# Patient Record
Sex: MALE | Race: WHITE | NOT HISPANIC OR LATINO | URBAN - METROPOLITAN AREA
[De-identification: names, ages, dates, MRNs, and addresses within clinical notes are randomized per-mention and may not be internally consistent; named-entity substitution may affect disease eponyms.]

---

## 2018-07-24 ENCOUNTER — EMERGENCY (EMERGENCY)
Facility: HOSPITAL | Age: 18
LOS: 1 days | Discharge: HOME | End: 2018-07-24
Attending: EMERGENCY MEDICINE | Admitting: EMERGENCY MEDICINE
Payer: COMMERCIAL

## 2018-07-24 VITALS
SYSTOLIC BLOOD PRESSURE: 130 MMHG | OXYGEN SATURATION: 100 % | HEART RATE: 64 BPM | RESPIRATION RATE: 16 BRPM | TEMPERATURE: 98 F | DIASTOLIC BLOOD PRESSURE: 74 MMHG

## 2018-07-24 PROCEDURE — 99284 EMERGENCY DEPT VISIT MOD MDM: CPT | Mod: 25

## 2018-07-24 PROCEDURE — 93010 ELECTROCARDIOGRAM REPORT: CPT

## 2018-07-24 NOTE — ED ADULT TRIAGE NOTE - CHIEF COMPLAINT QUOTE
Pt arrives w/ c/o chest pain since this morning. Pt worried because has history of 2 pneumothorax in the past. Denies SOB, nausea, or vomiting.

## 2018-07-25 VITALS
DIASTOLIC BLOOD PRESSURE: 77 MMHG | SYSTOLIC BLOOD PRESSURE: 122 MMHG | HEART RATE: 69 BPM | RESPIRATION RATE: 15 BRPM | OXYGEN SATURATION: 100 % | TEMPERATURE: 98 F

## 2018-07-25 PROCEDURE — 71046 X-RAY EXAM CHEST 2 VIEWS: CPT | Mod: 26

## 2018-07-25 NOTE — ED PROVIDER NOTE - PROGRESS NOTE DETAILS
Zvi Dubin, MD (pgy-[id number]) note: Pt seen & reassessed.  Pt pain free, not SOB, SpO2 100%.   A&Ox3, NAD, VSS, pt tolerated PO & ambulated w/steady unassisted gait in the ED.  Discussed results of ED w/u w/patient & his parents @bedside, and gave them a copy of results. Patient verbalized understanding of hospital course & f/u plans, has decisional making capacity.  Pt st they will f/u w/PMD within the next 3 days; pt agrees to call today or tomorrow for an appointment. Pt agrees to return to the ED if there is any worsening or concerning symptoms.

## 2018-07-25 NOTE — ED ADULT NURSE NOTE - OBJECTIVE STATEMENT
alert no distress  no c/o pain at present  has sternal chest pain earlier that started at 2230  no pain when lying down  pain is 4/10 when up    SR on scope  seen by Dr Dubin

## 2018-07-25 NOTE — ED PROVIDER NOTE - PLAN OF CARE
-- There is no pneuothorax on the Xray in the Emergency Department today.   -- Please follow up with your doctor(s) within the next 3 days, but seek medical care sooner if your symptoms worsen. Call for an appointment as soon as possible.  -- If you have worsening or concerning symptoms, see your doctor right away or return to the Emergency Department immediately.

## 2018-07-25 NOTE — ED PROVIDER NOTE - PHYSICAL EXAMINATION
mild kyphosis  no paradoxical chest movement, nasal flaring, retractions  no subcu air in neck or chest  No abd bruits or thrills.  No pulsatile abd masses.  No pulse deficits x4 ext. mild kyphosis  no paradoxical chest movement, nasal flaring, retractions  no subcu air in neck or chest  No abd bruits or thrills.  No pulsatile abd masses.  No pulse deficits x4 ext.  no LE edema, normal equal distal pulses.

## 2018-07-25 NOTE — ED PROVIDER NOTE - CARE PLAN
Principal Discharge DX:	Chest pain Principal Discharge DX:	Chest pain  Assessment and plan of treatment:	-- There is no pneuothorax on the Xray in the Emergency Department today.   -- Please follow up with your doctor(s) within the next 3 days, but seek medical care sooner if your symptoms worsen. Call for an appointment as soon as possible.  -- If you have worsening or concerning symptoms, see your doctor right away or return to the Emergency Department immediately.

## 2018-07-25 NOTE — ED PROVIDER NOTE - MEDICAL DECISION MAKING DETAILS
CP, possibly from PTX given hx.  pt HD stable, comfortable, without distress or hypoxia  plan for CXR & reassessment   no concern for ACS based on hx, age, RF & ekg CP, possibly from PTX given hx.  pt HD stable, comfortable, without distress or hypoxia.  minimal lung findings on exam may represent scar tissue from prior chest tube.  plan for CXR & reassessment   no concern for ACS based on hx, age, RF & ekg

## 2018-07-25 NOTE — ED PROVIDER NOTE - ATTENDING CONTRIBUTION TO CARE
18M PMH PTX (failed O2, s/p chest tube 2015) p/w intermittent midsternal cp, positional, x1d, no other systemic symptoms. Currently asymptomatic while sitting in stretcher. Vitals wnl, exam as above.  ddx: Likely MSK vs. GERD/gastritis vs. less likely PTX. clinically not ACS, PE, tamponade, dissection, perf, myocarditis, mediastinitis.   Does not want pain meds.  CXR, reassess.

## 2018-07-25 NOTE — ED PROVIDER NOTE - OBJECTIVE STATEMENT
18y M hx L PTX refractory to O2 req chest tube 2015 now p/w unprovoked 1d L inf/lat CP.  Worse w/ standing, better w/ sitting,  No noticeable change w/ exertion or deep breathing.  No SOB, BROWN, cough, hemoptysis.  Pt denied fvr/chills, HA, visual changes, dizziness, light headedness, presyncope, LOC, abd pain, n/v/d, changes in bowel or bladder habits, LE edema, numbness, weakness, changes in gait or rashes Denies sexual activity, abuse, drug or etoh use. 18y M hx L PTX refractory to O2 req chest tube 2015 now p/w unprovoked 1d L inf/lat CP.  Worse w/ standing, better w/ sitting,  No noticeable change w/ exertion or deep breathing.  No SOB, BROWN, cough, hemoptysis.  Pt denied fvr/chills, HA, visual changes, dizziness, light headedness, presyncope, LOC, abd pain, n/v/d, changes in bowel or bladder habits, LE edema, numbness, weakness, changes in gait or rashes Denies sexual activity, abuse, drug or etoh use.  [place]: 18M PMH PTX (failed O2, s/p chest tube 2015) p/w midsternal cp, awoke with it this morning, intermittent, mostly w/ standing and certain movements, resolved while sitting. Unsure if feels similar to prior PTX. Has not taken anything for pain. Denies SOB, NVD, lightheaded, diaphoresis, palpitations, cough/rhinorrhea, black/bloody stool, LE pain/swelling, focal weakness/numbness, recent travel/immobilization, abd pain, urinary complaints, f/c. No FMH CAD/clots. No prior cardiac w/u.

## 2022-02-03 ENCOUNTER — APPOINTMENT (OUTPATIENT)
Dept: OTOLARYNGOLOGY | Facility: CLINIC | Age: 22
End: 2022-02-03
Payer: COMMERCIAL

## 2022-02-03 VITALS
SYSTOLIC BLOOD PRESSURE: 110 MMHG | TEMPERATURE: 97.3 F | BODY MASS INDEX: 16.64 KG/M2 | HEART RATE: 88 BPM | WEIGHT: 106 LBS | DIASTOLIC BLOOD PRESSURE: 70 MMHG | OXYGEN SATURATION: 98 % | HEIGHT: 67 IN

## 2022-02-03 DIAGNOSIS — R05.9 COUGH, UNSPECIFIED: ICD-10-CM

## 2022-02-03 PROCEDURE — 99203 OFFICE O/P NEW LOW 30 MIN: CPT

## 2022-02-03 NOTE — HISTORY OF PRESENT ILLNESS
[de-identified] : Mr. HUSSEIN is a 21 year male with cough \par seen in urgent care and [place] \par was told it might be post nasal drip, covid was negative \par \par initially had URI, then developed sore throat and cough x 1 week, also had dysphonia \par minimal mucous with cough \par cough has since resolved down to only rare occurrence \par \par no fevers or chills, no myalgias\par

## 2022-02-03 NOTE — ASSESSMENT
[FreeTextEntry1] : cough and PND\par - sounds like he had paraflu, but testing at [facility] was negative\par - symptoms all resolve so no further intervention is recommended\par - work note written

## 2022-05-17 ENCOUNTER — NON-APPOINTMENT (OUTPATIENT)
Age: 22
End: 2022-05-17

## 2022-05-22 ENCOUNTER — NON-APPOINTMENT (OUTPATIENT)
Age: 22
End: 2022-05-22

## 2022-06-08 ENCOUNTER — APPOINTMENT (OUTPATIENT)
Dept: OTOLARYNGOLOGY | Facility: CLINIC | Age: 22
End: 2022-06-08
Payer: COMMERCIAL

## 2022-06-08 VITALS
DIASTOLIC BLOOD PRESSURE: 71 MMHG | HEIGHT: 67 IN | BODY MASS INDEX: 16.64 KG/M2 | WEIGHT: 106 LBS | SYSTOLIC BLOOD PRESSURE: 104 MMHG | TEMPERATURE: 98 F | HEART RATE: 83 BPM

## 2022-06-08 DIAGNOSIS — R09.81 NASAL CONGESTION: ICD-10-CM

## 2022-06-08 DIAGNOSIS — R09.82 POSTNASAL DRIP: ICD-10-CM

## 2022-06-08 PROCEDURE — 31231 NASAL ENDOSCOPY DX: CPT

## 2022-06-08 PROCEDURE — 99214 OFFICE O/P EST MOD 30 MIN: CPT | Mod: 25

## 2022-06-08 RX ORDER — FLUTICASONE PROPIONATE 50 UG/1
50 SPRAY, METERED NASAL
Qty: 3 | Refills: 3 | Status: ACTIVE | COMMUNITY
Start: 2022-06-08 | End: 1900-01-01

## 2022-06-08 NOTE — HISTORY OF PRESENT ILLNESS
[de-identified] : 22 y/o M, notes 2-3 weeks ago had Viral illness with fever, congestion, rhinitis PND.  Now illness has resolved, however, nasal congestion with PND and rhinitis continue.  No sinus pain or pressure.  Pos itchy throat.  No itchy eyes.  has been using nasal saline.  No hx of allergy testing. \par Had seen Dr. Herron in February of this year for the same symptoms following illness.  Symptoms resolved spontaneously.

## 2022-06-08 NOTE — PROCEDURE
[FreeTextEntry6] : Flexible scope #32 was used. Right nasal passage with normal inferior, middle and superior turbinates. Nasal passage patent with clear middle meatus and sphenoethmoid recess. Left nasal passage with normal inferior, middle and superior turbinates. Nasal passage was patent with clear middle meatus and sphenoethmoid recess. No mucopurulence or polyps appreciated. Nasopharynx with clear mucoid PND.  Left NDS  \par \par

## 2022-06-17 ENCOUNTER — APPOINTMENT (OUTPATIENT)
Dept: OTOLARYNGOLOGY | Facility: CLINIC | Age: 22
End: 2022-06-17

## 2022-09-17 ENCOUNTER — NON-APPOINTMENT (OUTPATIENT)
Age: 22
End: 2022-09-17